# Patient Record
(demographics unavailable — no encounter records)

---

## 2024-10-07 NOTE — REASON FOR VISIT
[Patient with collateral] : Patient with collateral  [Mother] : mother [FreeTextEntry1] : Autism/anxiety

## 2024-10-07 NOTE — PLAN
[No] : No [Medication education provided] : Medication education provided. [FreeTextEntry5] : Psychoeducation and supportive therapy provided Medication:  continue Abilify 2 mg BID.  Educated patient of importance of being and remaining abstinent from drugs and alcohol.  Emergency procedures were discussed: pt. educated to call 911 or go to nearest ER for worsening of symptoms/suicidal/homicidal ideation.  RTC in 3 months or earlier as needed  Patient and mother expressed understanding and agreement with above plan.

## 2024-10-07 NOTE — DISCUSSION/SUMMARY
[FreeTextEntry1] : The patient is a 23 yo man with diagnosis of Autism spectrum disorder, started having behavior problems, anger outbursts and agitation, difficult to redirect in early adulthood, and was started on Abilify for these episodes with good benefit, comes today with his mother to establish care.   09/10/19: Patient remains stable and is doing well on current meds  11/18/19: Patient continues to remain stable on current meds.  2/12/2020: Patient remains stable on current meds.  5/8/2020: Patient is adapting to change in daily routine of being at home and not being able to work because of pandemic.  7/21/2020: Patient remains stable, doing chores and helping mother at home and applying for job and back to some of his weekly activities.  10/5/2020: Patient continues to remain stable, will start working and keeps busy with activities at and outside home.  12/28/2020: Patient continues to remain stable, started working and keeps busy with activities at and outside home.  3/8/2021: Patient remains stable, no anxiety and no behavior problems and continues to work 5 days a week and busy with activities and helping out at home.  6/7/2021: Patient is doing well with no behavior problems, no anxiety issues since last visit. 8/23/2021: Patient remains stable, no report of anxiety/behavior problems since last visit.  11/16/2021: Patient continues to remain well, reports he is doing excellent, working to part-time jobs.  No report of anxiety or behavior problems since last visit.  Discussed with the patient and mother long-term side effects of being on Abilify including but not limited to metabolic side effects and motor side effects, patient has no motor side effects or metabolic side effects at this time. 2/15/2022:Patient continues to remain well, no anxiety or behavior problems since last visit. 6/1/2022: Patient remains stable, he was seen alone today per his and his mother's request as they are teaching him life skills and to be more independent.  09/14/2022: Patient continues to remain stable.  12/07/2022: Patient continues to remain stable and is expressing desire to be more independent and come to the visits and speak to the provider alone and mother is proving supervision and support  3/8/2023: Patient continues to remain stable expressing desire to be more independent and learning things and mom also had no concern with his mood behavior and attitude and current medications. 6/7/2023: Patient remained stable since last visit and no mood or behavior problems or anxiety issues and current medications continue maintain psychiatric symptom stability  9/5/2023: Patient remains stable with no behavior problems stable mood no anxiety issues and good adherence with current medications and seems to be active engaged and interested in learning to become more independent and do things by himself. 12/5/2023: Patient continues to remain stable with no behavior problems, stable mood and anxiety and good adherence to current medication regimen and is learning daily living tasks and wants to be more independent under guidance of his parents. 3/20/2024: Last visit with no behavior problems, good mood and anxiety and adherence with medication regimen.  7/16/2024: Continues to remain stable with no behavior problems, no anxiety and stable mood and good adherence with meds  10/7/2024: Patient remained stable with no anxiety, no behavior problems and good adherence with the current medications

## 2024-10-07 NOTE — PHYSICAL EXAM
[None] : none [Average] : average [Cooperative] : cooperative [Euthymic] : euthymic [Linear/Goal Directed] : linear/goal directed [Full] : full [None Reported] : none reported [WNL] : within normal limits [de-identified] : more spontaneous and he answered direct questions with short phrases and he also asked provider a couple appropriate questions  [FreeTextEntry7] : No SI/HI

## 2024-10-07 NOTE — HISTORY OF PRESENT ILLNESS
[FreeTextEntry1] : Patient accompanied by his mother today, but he wanted to be seen alone.  He spontaneously greeted the writer and was engaged in conversation. He mentioned he had "excellent" summer.  He said that there is nothing new to report.  Patient states he enjoyed spending time on the beach swimming in the ocean during summer and he will miss summer because that is his favorite season of the year.   Patient states he continues to work at [employer] 4 days a week and he enjoys working there.  He is attending art classes, art on Tuesday, Karate on Tue and Thurs, and Yoga on Thursday. He reported he will continue to swim at [facility] 4 days a week.  And he also will be walking on a treadmill at home 3 days a week.  Patient reported that they got a new dog in August and the dog's name is Viktoria.  Patient states that he spends time petting the dog which he enjoys and also keeps the dog. He reported sleeping well 9-11 hours, 9: 30 PM to 7:30/9:30 AM and appetite is very good.  Patient reported that he is helping his mother daily at home with making beds, cleaning dishes, cleaning the floor etc. He reported he does not feel sad or does not get upset.   He reported no tremors or balance issues. Patient did not feel that I need to speak with his mom and said he is doing good and when I saw the patient's mom and the waiting room, she said he has been doing well and that she had no concerns to discuss today.  good adherence with meds No new medical issues since last visit.

## 2024-10-14 NOTE — REASON FOR VISIT
[Follow-Up Visit] : a follow-up visit for [Chronic Leukemia] : chronic leukemia [Parent] : parent [FreeTextEntry2] : CML

## 2025-01-06 NOTE — DISCUSSION/SUMMARY
[FreeTextEntry1] : The patient is a 21 yo man with diagnosis of Autism spectrum disorder, started having behavior problems, anger outbursts and agitation, difficult to redirect in early adulthood, and was started on Abilify for these episodes with good benefit, comes today with his mother to establish care.   09/10/19: Patient remains stable and is doing well on current meds  11/18/19: Patient continues to remain stable on current meds.  2/12/2020: Patient remains stable on current meds.  5/8/2020: Patient is adapting to change in daily routine of being at home and not being able to work because of pandemic.  7/21/2020: Patient remains stable, doing chores and helping mother at home and applying for job and back to some of his weekly activities.  10/5/2020: Patient continues to remain stable, will start working and keeps busy with activities at and outside home.  12/28/2020: Patient continues to remain stable, started working and keeps busy with activities at and outside home.  3/8/2021: Patient remains stable, no anxiety and no behavior problems and continues to work 5 days a week and busy with activities and helping out at home.  6/7/2021: Patient is doing well with no behavior problems, no anxiety issues since last visit. 8/23/2021: Patient remains stable, no report of anxiety/behavior problems since last visit.  11/16/2021: Patient continues to remain well, reports he is doing excellent, working to part-time jobs.  No report of anxiety or behavior problems since last visit.  Discussed with the patient and mother long-term side effects of being on Abilify including but not limited to metabolic side effects and motor side effects, patient has no motor side effects or metabolic side effects at this time. 2/15/2022:Patient continues to remain well, no anxiety or behavior problems since last visit. 6/1/2022: Patient remains stable, he was seen alone today per his and his mother's request as they are teaching him life skills and to be more independent.  09/14/2022: Patient continues to remain stable.  12/07/2022: Patient continues to remain stable and is expressing desire to be more independent and come to the visits and speak to the provider alone and mother is proving supervision and support  3/8/2023: Patient continues to remain stable expressing desire to be more independent and learning things and mom also had no concern with his mood behavior and attitude and current medications. 6/7/2023: Patient remained stable since last visit and no mood or behavior problems or anxiety issues and current medications continue maintain psychiatric symptom stability  9/5/2023: Patient remains stable with no behavior problems stable mood no anxiety issues and good adherence with current medications and seems to be active engaged and interested in learning to become more independent and do things by himself. 12/5/2023: Patient continues to remain stable with no behavior problems, stable mood and anxiety and good adherence to current medication regimen and is learning daily living tasks and wants to be more independent under guidance of his parents. 3/20/2024: Last visit with no behavior problems, good mood and anxiety and adherence with medication regimen.  7/16/2024: Continues to remain stable with no behavior problems, no anxiety and stable mood and good adherence with meds  10/7/2024: Patient remained stable with no anxiety, no behavior problems and good adherence with the current medications 1/6/2025: Max presents as stable and in good spirits. He maintains a structured routine with work and activities, and reports no significant changes or concerns.

## 2025-01-06 NOTE — HISTORY OF PRESENT ILLNESS
[FreeTextEntry1] : Patient accompanied by his mother today, but he wanted to be seen alone.  He spontaneously greeted the writer and was engaged in conversation. Patient reports feeling excellent over the past three months. He had a wonderful holiday season, during which he won $10 and spent it at [employer] and [employer].  Patient's mood appears to be positive, reporting feeling 'excellent'. Patient reports no issues with sleep and appetite He is maintaining a busy schedule with work and activities, including work four days a week (Monday, Tuesday, Thursday, Friday), art classes on Tuesdays, and karate classes on Tuesdays and Thursdays. Patient reported that he is helping his mother daily at home with making beds, cleaning dishes, cleaning the floor etc. He reported he does not feel sad or does not get upset.   He reported no tremors or balance issues. Patient did not feel that I need to speak with his mom and said he is doing good and when I saw the patient's mom and the waiting room, she said he has been doing well and that she had no concerns to discuss today.  good adherence with meds No new medical issues since last visit.

## 2025-01-06 NOTE — PHYSICAL EXAM
[None] : none [Average] : average [Cooperative] : cooperative [Euthymic] : euthymic [Full] : full [Linear/Goal Directed] : linear/goal directed [None Reported] : none reported [WNL] : within normal limits [de-identified] : more spontaneous and he answered direct questions with short phrases and he also asked provider a couple appropriate questions  [FreeTextEntry7] : No SI/HI

## 2025-01-06 NOTE — HISTORY OF PRESENT ILLNESS
[FreeTextEntry1] : Patient accompanied by his mother today, but he wanted to be seen alone.  He spontaneously greeted the writer and was engaged in conversation. Patient reports feeling excellent over the past three months. He had a wonderful holiday season, during which he won $10 and spent it at [employer].  Patient's mood appears to be positive, reporting feeling 'excellent'. Patient reports no issues with sleep and appetite He is maintaining a busy schedule with work and activities, including work four days a week (Monday, Tuesday, Thursday, Friday), art classes on Tuesdays, and karate classes on Tuesdays and Thursdays. Patient reported that he is helping his mother daily at home with making beds, cleaning dishes, cleaning the floor etc. He reported he does not feel sad or does not get upset.   He reported no tremors or balance issues. Patient did not feel that I need to speak with his mom and said he is doing good and when I saw the patient's mom and the waiting room, she said he has been doing well and that she had no concerns to discuss today.  good adherence with meds No new medical issues since last visit.

## 2025-01-06 NOTE — PHYSICAL EXAM
[None] : none [Average] : average [Cooperative] : cooperative [Euthymic] : euthymic [Full] : full [Linear/Goal Directed] : linear/goal directed [None Reported] : none reported [WNL] : within normal limits [de-identified] : more spontaneous and he answered direct questions with short phrases and he also asked provider a couple appropriate questions  [FreeTextEntry7] : No SI/HI

## 2025-02-12 NOTE — ASSESSMENT
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 His PCR fluctuates between MMR4 and MMR3; last BCR/ABL 0.019%  CBC reviewed Check his BCR-ABL today. Continue gleevec 400 mg daily -refilled today Check his CMP Continue to follow with psychiatry. He will continue to follow up every 4 months.

## 2025-04-07 NOTE — PHYSICAL EXAM
[None] : none [Average] : average [Cooperative] : cooperative [Euthymic] : euthymic [Full] : full [Linear/Goal Directed] : linear/goal directed [None Reported] : none reported [WNL] : within normal limits [de-identified] : spontaneous and he answered direct questions with short phrases, and he also asked provider a couple appropriate questions  [FreeTextEntry7] : No SI/HI

## 2025-04-07 NOTE — DISCUSSION/SUMMARY
[FreeTextEntry1] : The patient is a 23 yo man with diagnosis of Autism spectrum disorder, started having behavior problems, anger outbursts and agitation, difficult to redirect in early adulthood, and was started on Abilify for these episodes with good benefit, comes today with his mother to establish care.   09/10/19: Patient remains stable and is doing well on current meds  11/18/19: Patient continues to remain stable on current meds.  2/12/2020: Patient remains stable on current meds.  5/8/2020: Patient is adapting to change in daily routine of being at home and not being able to work because of pandemic.  7/21/2020: Patient remains stable, doing chores and helping mother at home and applying for job and back to some of his weekly activities.  10/5/2020: Patient continues to remain stable, will start working and keeps busy with activities at and outside home.  12/28/2020: Patient continues to remain stable, started working and keeps busy with activities at and outside home.  3/8/2021: Patient remains stable, no anxiety and no behavior problems and continues to work 5 days a week and busy with activities and helping out at home.  6/7/2021: Patient is doing well with no behavior problems, no anxiety issues since last visit. 8/23/2021: Patient remains stable, no report of anxiety/behavior problems since last visit.  11/16/2021: Patient continues to remain well, reports he is doing excellent, working to part-time jobs.  No report of anxiety or behavior problems since last visit.  Discussed with the patient and mother long-term side effects of being on Abilify including but not limited to metabolic side effects and motor side effects, patient has no motor side effects or metabolic side effects at this time. 2/15/2022:Patient continues to remain well, no anxiety or behavior problems since last visit. 6/1/2022: Patient remains stable, he was seen alone today per his and his mother's request as they are teaching him life skills and to be more independent.  09/14/2022: Patient continues to remain stable.  12/07/2022: Patient continues to remain stable and is expressing desire to be more independent and come to the visits and speak to the provider alone and mother is proving supervision and support  3/8/2023: Patient continues to remain stable expressing desire to be more independent and learning things and mom also had no concern with his mood behavior and attitude and current medications. 6/7/2023: Patient remained stable since last visit and no mood or behavior problems or anxiety issues and current medications continue maintain psychiatric symptom stability  9/5/2023: Patient remains stable with no behavior problems stable mood no anxiety issues and good adherence with current medications and seems to be active engaged and interested in learning to become more independent and do things by himself. 12/5/2023: Patient continues to remain stable with no behavior problems, stable mood and anxiety and good adherence to current medication regimen and is learning daily living tasks and wants to be more independent under guidance of his parents. 3/20/2024: Last visit with no behavior problems, good mood and anxiety and adherence with medication regimen.  7/16/2024: Continues to remain stable with no behavior problems, no anxiety and stable mood and good adherence with meds  10/7/2024: Patient remained stable with no anxiety, no behavior problems and good adherence with the current medications 1/6/2025: Max presents as stable and in good spirits. He maintains a structured routine with work and activities, and reports no significant changes or concerns. 4/7/2025: Patient continues to remain stable, no report of anxiety, agitation or outbursts and he reports he is in good spirits.  He continues to maintain a busy schedule at work and helping out the family and activities that he enjoys.

## 2025-04-07 NOTE — HISTORY OF PRESENT ILLNESS
[FreeTextEntry1] : Patient accompanied by his mother today, but he wanted to be seen alone.  He spontaneously greeted the writer and was engaged in conversation. Patient states he is "excellent" over the past three months.  Patient's mood appears to be positive, reporting feeling 'excellent'. Patient reports no issues with sleep and appetite He states he is maintaining a busy schedule with work and activities, including work four days a week (Monday, Tuesday, Thursday, Friday), art classes on Tuesdays, and karate classes on Tuesdays and Thursdays.  He states he also goes to [facility] and swims and work walks on the treadmill at least a couple times a week. Patient reported that he is helping his mother daily at home with making beds, cleaning dishes, cleaning the floor etc. He reported he does not feel sad or does not get upset.   He reported no tremors or balance issues. Patient states that he is looking forward to his birthday celebrations with family and they are planning to go to a [employer] restaurant. Patient did not feel that I need to speak with his mom and said he is doing good and when I saw the patient's mom and the waiting room, she said he has been doing well and that she had no concerns to discuss today.  good adherence with meds No new medical issues since last visit.

## 2025-06-11 NOTE — ASSESSMENT
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 His PCR fluctuates between MMR4 and MMR3; last BCR/ABL 0.022%  CBC reviewed Check his BCR-ABL today. Continue gleevec 400 mg daily -refilled today Check his CMP Continue to follow with psychiatry. He will continue to follow up every 4 months.

## 2025-07-07 NOTE — PHYSICAL EXAM
[None] : none [Average] : average [Cooperative] : cooperative [Euthymic] : euthymic [Full] : full [Linear/Goal Directed] : linear/goal directed [None Reported] : none reported [WNL] : within normal limits [de-identified] : spontaneous and he answered direct questions with short phrases, and he also asked provider a couple appropriate questions  [FreeTextEntry7] : No SI/HI

## 2025-07-07 NOTE — DISCUSSION/SUMMARY
[FreeTextEntry1] : The patient is a 21 yo man with diagnosis of Autism spectrum disorder, started having behavior problems, anger outbursts and agitation, difficult to redirect in early adulthood, and was started on Abilify for these episodes with good benefit, comes today with his mother to establish care.   09/10/19: Patient remains stable and is doing well on current meds  11/18/19: Patient continues to remain stable on current meds.  2/12/2020: Patient remains stable on current meds.  5/8/2020: Patient is adapting to change in daily routine of being at home and not being able to work because of pandemic.  7/21/2020: Patient remains stable, doing chores and helping mother at home and applying for job and back to some of his weekly activities.  10/5/2020: Patient continues to remain stable, will start working and keeps busy with activities at and outside home.  12/28/2020: Patient continues to remain stable, started working and keeps busy with activities at and outside home.  3/8/2021: Patient remains stable, no anxiety and no behavior problems and continues to work 5 days a week and busy with activities and helping out at home.  6/7/2021: Patient is doing well with no behavior problems, no anxiety issues since last visit. 8/23/2021: Patient remains stable, no report of anxiety/behavior problems since last visit.  11/16/2021: Patient continues to remain well, reports he is doing excellent, working to part-time jobs.  No report of anxiety or behavior problems since last visit.  Discussed with the patient and mother long-term side effects of being on Abilify including but not limited to metabolic side effects and motor side effects, patient has no motor side effects or metabolic side effects at this time. 2/15/2022:Patient continues to remain well, no anxiety or behavior problems since last visit. 6/1/2022: Patient remains stable, he was seen alone today per his and his mother's request as they are teaching him life skills and to be more independent.  09/14/2022: Patient continues to remain stable.  12/07/2022: Patient continues to remain stable and is expressing desire to be more independent and come to the visits and speak to the provider alone and mother is proving supervision and support  3/8/2023: Patient continues to remain stable expressing desire to be more independent and learning things and mom also had no concern with his mood behavior and attitude and current medications. 6/7/2023: Patient remained stable since last visit and no mood or behavior problems or anxiety issues and current medications continue maintain psychiatric symptom stability  9/5/2023: Patient remains stable with no behavior problems stable mood no anxiety issues and good adherence with current medications and seems to be active engaged and interested in learning to become more independent and do things by himself. 12/5/2023: Patient continues to remain stable with no behavior problems, stable mood and anxiety and good adherence to current medication regimen and is learning daily living tasks and wants to be more independent under guidance of his parents. 3/20/2024: Last visit with no behavior problems, good mood and anxiety and adherence with medication regimen.  7/16/2024: Continues to remain stable with no behavior problems, no anxiety and stable mood and good adherence with meds  10/7/2024: Patient remained stable with no anxiety, no behavior problems and good adherence with the current medications 1/6/2025: Max presents as stable and in good spirits. He maintains a structured routine with work and activities, and reports no significant changes or concerns. 4/7/2025: Patient continues to remain stable, no report of anxiety, agitation or outbursts and he reports he is in good spirits.  He continues to maintain a busy schedule at work and helping out the family and activities that he enjoys. 7/7/2025: Patient remains stable, reports no issues with the mood or anxiety and behavior continues to work 4 days a week part-time and also is helping out his family and continues to engage in activities that he enjoys.

## 2025-07-07 NOTE — PHYSICAL EXAM
[None] : none [Average] : average [Cooperative] : cooperative [Euthymic] : euthymic [Full] : full [Linear/Goal Directed] : linear/goal directed [None Reported] : none reported [WNL] : within normal limits [de-identified] : spontaneous and he answered direct questions with short phrases, and he also asked provider a couple appropriate questions  [FreeTextEntry7] : No SI/HI

## 2025-07-07 NOTE — DISCUSSION/SUMMARY
[FreeTextEntry1] : The patient is a 23 yo man with diagnosis of Autism spectrum disorder, started having behavior problems, anger outbursts and agitation, difficult to redirect in early adulthood, and was started on Abilify for these episodes with good benefit, comes today with his mother to establish care.   09/10/19: Patient remains stable and is doing well on current meds  11/18/19: Patient continues to remain stable on current meds.  2/12/2020: Patient remains stable on current meds.  5/8/2020: Patient is adapting to change in daily routine of being at home and not being able to work because of pandemic.  7/21/2020: Patient remains stable, doing chores and helping mother at home and applying for job and back to some of his weekly activities.  10/5/2020: Patient continues to remain stable, will start working and keeps busy with activities at and outside home.  12/28/2020: Patient continues to remain stable, started working and keeps busy with activities at and outside home.  3/8/2021: Patient remains stable, no anxiety and no behavior problems and continues to work 5 days a week and busy with activities and helping out at home.  6/7/2021: Patient is doing well with no behavior problems, no anxiety issues since last visit. 8/23/2021: Patient remains stable, no report of anxiety/behavior problems since last visit.  11/16/2021: Patient continues to remain well, reports he is doing excellent, working to part-time jobs.  No report of anxiety or behavior problems since last visit.  Discussed with the patient and mother long-term side effects of being on Abilify including but not limited to metabolic side effects and motor side effects, patient has no motor side effects or metabolic side effects at this time. 2/15/2022:Patient continues to remain well, no anxiety or behavior problems since last visit. 6/1/2022: Patient remains stable, he was seen alone today per his and his mother's request as they are teaching him life skills and to be more independent.  09/14/2022: Patient continues to remain stable.  12/07/2022: Patient continues to remain stable and is expressing desire to be more independent and come to the visits and speak to the provider alone and mother is proving supervision and support  3/8/2023: Patient continues to remain stable expressing desire to be more independent and learning things and mom also had no concern with his mood behavior and attitude and current medications. 6/7/2023: Patient remained stable since last visit and no mood or behavior problems or anxiety issues and current medications continue maintain psychiatric symptom stability  9/5/2023: Patient remains stable with no behavior problems stable mood no anxiety issues and good adherence with current medications and seems to be active engaged and interested in learning to become more independent and do things by himself. 12/5/2023: Patient continues to remain stable with no behavior problems, stable mood and anxiety and good adherence to current medication regimen and is learning daily living tasks and wants to be more independent under guidance of his parents. 3/20/2024: Last visit with no behavior problems, good mood and anxiety and adherence with medication regimen.  7/16/2024: Continues to remain stable with no behavior problems, no anxiety and stable mood and good adherence with meds  10/7/2024: Patient remained stable with no anxiety, no behavior problems and good adherence with the current medications 1/6/2025: Max presents as stable and in good spirits. He maintains a structured routine with work and activities, and reports no significant changes or concerns. 4/7/2025: Patient continues to remain stable, no report of anxiety, agitation or outbursts and he reports he is in good spirits.  He continues to maintain a busy schedule at work and helping out the family and activities that he enjoys. 7/7/2025: Patient remains stable, reports no issues with the mood or anxiety and behavior continues to work 4 days a week part-time and also is helping out his family and continues to engage in activities that he enjoys.